# Patient Record
Sex: FEMALE | Race: WHITE | NOT HISPANIC OR LATINO | Employment: FULL TIME | URBAN - METROPOLITAN AREA
[De-identification: names, ages, dates, MRNs, and addresses within clinical notes are randomized per-mention and may not be internally consistent; named-entity substitution may affect disease eponyms.]

---

## 2024-05-19 DIAGNOSIS — J40 BRONCHITIS: Primary | ICD-10-CM

## 2024-05-19 RX ORDER — CODEINE PHOSPHATE AND GUAIFENESIN 10; 100 MG/5ML; MG/5ML
5 SOLUTION ORAL EVERY 6 HOURS PRN
Qty: 100 ML | Refills: 0 | Status: SHIPPED | OUTPATIENT
Start: 2024-05-19 | End: 2024-05-26

## 2024-05-19 RX ORDER — AMOXICILLIN AND CLAVULANATE POTASSIUM 875; 125 MG/1; MG/1
1 TABLET, FILM COATED ORAL 2 TIMES DAILY
Qty: 10 TABLET | Refills: 0 | Status: SHIPPED | OUTPATIENT
Start: 2024-05-19 | End: 2024-05-24

## 2024-12-16 ENCOUNTER — OFFICE VISIT (OUTPATIENT)
Dept: ORTHOPEDIC SURGERY | Facility: CLINIC | Age: 34
End: 2024-12-16
Payer: COMMERCIAL
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
## 2024-12-16 DIAGNOSIS — L08.9 CAT BITE OF MULTIPLE SITES OF RIGHT HAND AND FINGERS WITH INFECTION, INITIAL ENCOUNTER: Primary | ICD-10-CM

## 2024-12-16 DIAGNOSIS — S61.451A CAT BITE OF MULTIPLE SITES OF RIGHT HAND AND FINGERS WITH INFECTION, INITIAL ENCOUNTER: Primary | ICD-10-CM

## 2024-12-16 DIAGNOSIS — S61.259A CAT BITE OF MULTIPLE SITES OF RIGHT HAND AND FINGERS WITH INFECTION, INITIAL ENCOUNTER: Primary | ICD-10-CM

## 2024-12-16 PROCEDURE — 99203 OFFICE O/P NEW LOW 30 MIN: CPT | Performed by: ORTHOPAEDIC SURGERY

## 2024-12-16 PROCEDURE — 1036F TOBACCO NON-USER: CPT | Performed by: ORTHOPAEDIC SURGERY

## 2024-12-16 RX ORDER — SERTRALINE HYDROCHLORIDE 50 MG/1
75 TABLET, FILM COATED ORAL DAILY
COMMUNITY

## 2024-12-16 RX ORDER — EPINEPHRINE 0.3 MG/.3ML
0.3 INJECTION SUBCUTANEOUS AS NEEDED
COMMUNITY
Start: 2024-06-12

## 2024-12-16 ASSESSMENT — PAIN SCALES - GENERAL: PAINLEVEL_OUTOF10: 2

## 2024-12-16 ASSESSMENT — PAIN - FUNCTIONAL ASSESSMENT: PAIN_FUNCTIONAL_ASSESSMENT: 0-10

## 2024-12-17 NOTE — PROGRESS NOTES
History of Present Illness:  Chief Complaint   Patient presents with    Right Hand - Animal Bite     Cat bite       Patient presents today for evaluation of cat bite to her right hand that occurred on December 14, 2024.  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  Hospitalist prescribed Augmentin the day following the cat bite.  She reports that the pain has been improving since she started taking the Augmentin.  She has only had 2 doses of the antibiotic at this time.  There was some concern of additional swelling this morning and she was referred for further evaluation.  No fevers or chills.  She did note a small amount of drainage by the puncture wounds last night, but no drainage today.    History reviewed. No pertinent past medical history.    Medication Documentation Review Audit       Reviewed by Adali Gustafson CMA (Medical Assistant) on 12/16/24 at 0953      Medication Order Taking? Sig Documenting Provider Last Dose Status   EPINEPHrine 0.3 mg/0.3 mL injection syringe [id number] Yes Inject 0.3 mL (0.3 mg) into the muscle if needed. Historical Provider, MD  Active   sertraline (Zoloft) 50 mg tablet [id number]  Take 1.5 tablets (75 mg) by mouth once daily. Historical Provider, MD  Active                    Allergies   Allergen Reactions    Bee Venom Protein (Honey Bee) Swelling     Swelling at bee sting site       Social History     Socioeconomic History    Marital status:      Spouse name: Not on file    Number of children: Not on file    Years of education: Not on file    Highest education level: Not on file   Occupational History    Not on file   Tobacco Use    Smoking status: Never    Smokeless tobacco: Never   Substance and Sexual Activity    Alcohol use: Not Currently    Drug use: Never    Sexual activity: Defer   Other Topics Concern    Not on file   Social History Narrative    Not on file     Social Drivers of Health     Financial Resource Strain: Not on file   Food Insecurity: Not on file    Transportation Needs: Not on file   Physical Activity: Not on file   Stress: Not on file   Social Connections: Not on file   Intimate Partner Violence: Not on file   Housing Stability: Not on file       History reviewed. No pertinent surgical history.     Review of Systems   GENERAL: Negative for malaise, significant weight loss, fever  MUSCULOSKELETAL: see HPI  NEURO:  Negative     Physical Examination  Constitutional: Appears well-developed and well-nourished.  Head: Normocephalic and atraumatic.  Eyes: EOMI grossly  Cardiovascular: Intact distal pulses.   Respiratory: Effort normal. No respiratory distress.  Neurologic: Alert and oriented to person, place, and time.  Skin: Skin is warm and dry.  Hematologic / Lymphatic: No lymphedema, lymphangitis.  Psychiatric: normal mood and affect. Behavior is normal.   Musculoskeletal:  Right hand: Skin intact.  Mild edema and erythema about the 2 puncture wounds.  With deep pressure around these wounds unable to express any fluid at this time.  No migrating erythema proximally/distally.  0 cm DPC.  Able to fully extend all digits.  Capillary refill less than 2 seconds.  No tenderness along flexor/extensor tendons.  Tenderness fairly localized immediately around puncture wounds.  Sensation intact distally.  EPL/FPL and intrinsic function intact.     Assessment:  Patient with right hand cat bite with 2 puncture wounds and associated cellulitis.     Plan:  Nature of the diagnosis was discussed with the patient.  At this time there is no evidence of deep space infection.  Per the patient's report the cellulitis does seem to be improving on oral antibiotics.  We discussed warning signs/symptoms concerning for worsening and would recommend that she report to the emergency room for potential IV antibiotics if symptoms worsening on oral outpatient therapy.  Recommend continued mobilization.  We also discussed use of bandages with small amount of bacitracin to potentially allow  for any continued drainage.  She will follow-up with me if any persistent issues/concerns.  Complete course of Augmentin as prescribed.

## 2025-02-04 PROCEDURE — RXMED WILLOW AMBULATORY MEDICATION CHARGE

## 2025-02-06 ENCOUNTER — PHARMACY VISIT (OUTPATIENT)
Dept: PHARMACY | Facility: CLINIC | Age: 35
End: 2025-02-06
Payer: COMMERCIAL